# Patient Record
Sex: MALE | Race: OTHER | Employment: UNEMPLOYED | URBAN - METROPOLITAN AREA
[De-identification: names, ages, dates, MRNs, and addresses within clinical notes are randomized per-mention and may not be internally consistent; named-entity substitution may affect disease eponyms.]

---

## 2024-04-22 ENCOUNTER — HOSPITAL ENCOUNTER (EMERGENCY)
Facility: HOSPITAL | Age: 24
Discharge: HOME | End: 2024-04-22
Attending: EMERGENCY MEDICINE

## 2024-04-22 VITALS
OXYGEN SATURATION: 98 % | RESPIRATION RATE: 18 BRPM | SYSTOLIC BLOOD PRESSURE: 147 MMHG | TEMPERATURE: 99.7 F | HEART RATE: 64 BPM | DIASTOLIC BLOOD PRESSURE: 80 MMHG

## 2024-04-22 DIAGNOSIS — Z20.2 STD EXPOSURE: Primary | ICD-10-CM

## 2024-04-22 LAB
BACTERIA UR QL AUTO: ABNORMAL /HPF
BILIRUB UR QL STRIP: NEGATIVE
CLARITY UR: CLEAR
COLOR UR: ABNORMAL
GLUCOSE UR STRIP-MCNC: NEGATIVE MG/DL
HGB UR QL STRIP.AUTO: NEGATIVE
KETONES UR STRIP-MCNC: ABNORMAL MG/DL
LEUKOCYTE ESTERASE UR QL STRIP: ABNORMAL
NITRITE UR QL STRIP: NEGATIVE
NON-SQ EPI CELLS URNS QL MICRO: ABNORMAL /HPF
PH UR STRIP.AUTO: 6 [PH]
PROT UR STRIP-MCNC: NEGATIVE MG/DL
RBC #/AREA URNS AUTO: ABNORMAL /HPF
SP GR UR STRIP.AUTO: 1.01 (ref 1–1.03)
UROBILINOGEN UR STRIP-ACNC: <2 MG/DL
WBC #/AREA URNS AUTO: ABNORMAL /HPF

## 2024-04-22 PROCEDURE — 96372 THER/PROPH/DIAG INJ SC/IM: CPT

## 2024-04-22 PROCEDURE — 99283 EMERGENCY DEPT VISIT LOW MDM: CPT

## 2024-04-22 PROCEDURE — 87086 URINE CULTURE/COLONY COUNT: CPT

## 2024-04-22 PROCEDURE — 87591 N.GONORRHOEAE DNA AMP PROB: CPT

## 2024-04-22 PROCEDURE — 81001 URINALYSIS AUTO W/SCOPE: CPT

## 2024-04-22 PROCEDURE — 99284 EMERGENCY DEPT VISIT MOD MDM: CPT | Performed by: EMERGENCY MEDICINE

## 2024-04-22 PROCEDURE — 87491 CHLMYD TRACH DNA AMP PROBE: CPT

## 2024-04-22 RX ORDER — DOXYCYCLINE HYCLATE 100 MG/1
100 CAPSULE ORAL 2 TIMES DAILY
Qty: 14 CAPSULE | Refills: 0 | Status: SHIPPED | OUTPATIENT
Start: 2024-04-22 | End: 2024-04-29

## 2024-04-22 RX ORDER — DOXYCYCLINE HYCLATE 100 MG/1
100 CAPSULE ORAL ONCE
Status: COMPLETED | OUTPATIENT
Start: 2024-04-22 | End: 2024-04-22

## 2024-04-22 RX ORDER — ONDANSETRON 4 MG/1
4 TABLET, ORALLY DISINTEGRATING ORAL ONCE
Status: COMPLETED | OUTPATIENT
Start: 2024-04-22 | End: 2024-04-22

## 2024-04-22 RX ADMIN — CEFTRIAXONE 500 MG: 1 INJECTION, POWDER, FOR SOLUTION INTRAMUSCULAR; INTRAVENOUS at 15:45

## 2024-04-22 RX ADMIN — DOXYCYCLINE 100 MG: 100 CAPSULE ORAL at 15:44

## 2024-04-22 RX ADMIN — ONDANSETRON 4 MG: 4 TABLET, ORALLY DISINTEGRATING ORAL at 15:44

## 2024-04-22 NOTE — ED ATTENDING ATTESTATION
Final Diagnoses:     1. STD exposure           I, Victor M Lr MD, saw and evaluated the patient. All available labs and X-rays were ordered by me or the resident / non-physician and have been reviewed by myself. I discussed the patient with the resident / non-physician and agree with the resident's / non-physician practitioner's findings and plan as documented in the resident's / non-physician practicitioner's note, except where noted.   At this point, I agree with the current assessment done in the ED.   I was present during key portions of all procedures performed unless otherwise stated.     HPI:  NURSING TRIAGE:    This is a 23 y.o. male presenting for evaluation of STD testing. Girlfriend has gonorrhea and herpes.   Patient wants testing.   No f/ch/n/v.  Denies any urinary tract infection symptoms (burning, itching, pain, blood, frequency).   Chief Complaint   Patient presents with    Exposure to STD     Pt's girlfriend had +gonorrhea test and pt wants to get checked. Pt denies any symptoms at this time.       PHYSICAL: ASSESSMENT + PLAN:   General: VS reviewed  Appears in NAD  awake, alert.   Well-nourished, well-developed. Appears stated age.   Speaking normally in full sentences.   Head: Normocephalic, atraumatic  Eyes: EOM-I. No diplopia.   No hyphema.   No subconjunctival hemorrhages.  Symmetrical lids.   ENT: Atraumatic external nose and ears.    MMM  No malocclusion. No stridor. Normal phonation. No drooling. Normal swallowing.   Neck: No JVD.  CV: No pallor noted  Lungs:   No tachypnea  No respiratory distress  Abd: soft nt nd no rebound/guarding  MSK:   FROM spontaneously  Skin: Dry, intact.   Neuro: Awake, alert, GCS15, CN II-XII grossly intact.   Motor grossly intact.  Psychiatric/Behavioral: interacting normally; appropriate mood/affect.    Exam: deferred    Vitals:    04/22/24 1406   BP: 147/80   Pulse: 64   Resp: 18   Temp: 99.7 °F (37.6 °C)   TempSrc: Temporal   SpO2: 98%    STD  testing  Empiric ABX  DC     There are no obvious limitations to social determinants of care.   Nursing note reviewed.   Vitals reviewed.   Orders placed by myself and/or advanced practitioner / resident.    Previous chart was reviewed  No language barrier.   History obtained from patient.    There are no limitations to the history obtained:     Past Medical: Past Surgical:    has no past medical history on file.  has no past surgical history on file.   Social: Cardiac (Echo/Cath)   Social History     Substance and Sexual Activity   Alcohol Use None     Social History     Tobacco Use   Smoking Status Not on file   Smokeless Tobacco Not on file     Social History     Substance and Sexual Activity   Drug Use Not on file    No results found for this or any previous visit.    No results found for this or any previous visit.    No results found for this or any previous visit.     Labs: Imaging:   Labs Reviewed - No data to display No orders to display      Medications: Code Status:   Medications   cefTRIAXone (ROCEPHIN) 1,000 mg in lidocaine (PF) (XYLOCAINE-MPF) 1 % IM only syringe (has no administration in time range)   doxycycline hyclate (VIBRAMYCIN) capsule 100 mg (has no administration in time range)    Code Status: No Order  Advance Directive and Living Will:      Power of :    POLST:       Orders Placed This Encounter   Procedures    Chlamydia/GC amplified DNA by PCR    UA w Reflex to Microscopic w Reflex to Culture     Time reflects when diagnosis was documented in both MDM as applicable and the Disposition within this note       Time User Action Codes Description Comment    4/22/2024  3:33 PM Victor M Lr Add [Z20.2] STD exposure           ED Disposition       ED Disposition   Discharge    Condition   Stable    Date/Time   Mon Apr 22, 2024  3:30 PM    Comment   Guille Bo discharge to home/self care.                   Follow-up Information    None       Patient's Medications  "   No medications on file     No discharge procedures on file.  None                        Portions of the record may have been created with voice recognition software. Occasional wrong word or \"sound a like\" substitutions may have occurred due to the inherent limitations of voice recognition software. Read the chart carefully and recognize, using context, where substitutions have occurred.    Electronically signed by:  Victor M Lr  " Detail Level: Detailed Lesion Type: Abscess Method: sterile needle Curette: No Anesthesia Type: 1% lidocaine without epinephrine Anesthesia Volume In Cc: 1 Size Of Lesion In Cm (Optional But May Be Required For Some Insurances): 0 Wound Care: Mupirocin Dressing: Band-Aid Epidermal Sutures: 4-0 Ethilon Epidermal Closure: simple interrupted Suture Text: The incision was partially closed with Preparation Text: The area was prepped in the usual clean fashion. Curette Text (Optional): After the contents were expressed a curette was used to partially remove the cyst wall. Consent was obtained and risks were reviewed including but not limited to delayed wound healing, infection, need for multiple I and D's, and pain. Render Postcare In Note?: Yes Post-Care Instructions: I reviewed with the patient in detail post-care instructions. Patient should keep wound covered and call the office should any redness, pain, swelling or worsening occur.

## 2024-04-22 NOTE — ED PROVIDER NOTES
History  Chief Complaint   Patient presents with    Exposure to STD     Pt's girlfriend had +gonorrhea test and pt wants to get checked. Pt denies any symptoms at this time.      Patient is a 23-year-old male presenting after gonorrhea exposure.  Patient is sexually active with a female partner who recently tested positive for gonorrhea.  He is requesting testing and treatment.  He is otherwise asymptomatic with negative review of systems.        None       History reviewed. No pertinent past medical history.    History reviewed. No pertinent surgical history.    History reviewed. No pertinent family history.  I have reviewed and agree with the history as documented.    E-Cigarette/Vaping     E-Cigarette/Vaping Substances           Review of Systems   All other systems reviewed and are negative.      Physical Exam  ED Triage Vitals [04/22/24 1406]   Temperature Pulse Respirations Blood Pressure SpO2   99.7 °F (37.6 °C) 64 18 147/80 98 %      Temp Source Heart Rate Source Patient Position - Orthostatic VS BP Location FiO2 (%)   Temporal Monitor -- -- --      Pain Score       --             Orthostatic Vital Signs  Vitals:    04/22/24 1406   BP: 147/80   Pulse: 64       Physical Exam  Vitals and nursing note reviewed.   Constitutional:       General: He is not in acute distress.     Appearance: He is well-developed.   HENT:      Head: Normocephalic and atraumatic.   Eyes:      Conjunctiva/sclera: Conjunctivae normal.   Cardiovascular:      Rate and Rhythm: Normal rate and regular rhythm.      Heart sounds: No murmur heard.  Pulmonary:      Effort: Pulmonary effort is normal. No respiratory distress.      Breath sounds: Normal breath sounds.   Abdominal:      Palpations: Abdomen is soft.      Tenderness: There is no abdominal tenderness.   Musculoskeletal:         General: No swelling.      Cervical back: Neck supple.   Skin:     General: Skin is warm and dry.      Capillary Refill: Capillary refill takes less than 2  seconds.   Neurological:      Mental Status: He is alert.   Psychiatric:         Mood and Affect: Mood normal.         ED Medications  Medications   doxycycline hyclate (VIBRAMYCIN) capsule 100 mg (100 mg Oral Given 4/22/24 1544)   ondansetron (ZOFRAN-ODT) dispersible tablet 4 mg (4 mg Oral Given 4/22/24 1544)   cefTRIAXone (ROCEPHIN) 500 mg in lidocaine (PF) (XYLOCAINE-MPF) 1 % IM only syringe (500 mg Intramuscular Given 4/22/24 1545)       Diagnostic Studies  Results Reviewed       Procedure Component Value Units Date/Time    Urine Microscopic [[id number]]  (Abnormal) Collected: 04/22/24 1550    Lab Status: Final result Specimen: Urine, Other Updated: 04/22/24 1606     RBC, UA 1-2 /hpf      WBC, UA 30-50 /hpf      Epithelial Cells None Seen /hpf      Bacteria, UA None Seen /hpf     Urine culture [[id number]] Collected: 04/22/24 1550    Lab Status: In process Specimen: Urine, Other Updated: 04/22/24 1606    UA w Reflex to Microscopic w Reflex to Culture [[id number]]  (Abnormal) Collected: 04/22/24 1550    Lab Status: Final result Specimen: Urine, Other Updated: 04/22/24 1604     Color, UA Light Yellow     Clarity, UA Clear     Specific Gravity, UA 1.012     pH, UA 6.0     Leukocytes, UA Moderate     Nitrite, UA Negative     Protein, UA Negative mg/dl      Glucose, UA Negative mg/dl      Ketones, UA Trace mg/dl      Urobilinogen, UA <2.0 mg/dl      Bilirubin, UA Negative     Occult Blood, UA Negative    Chlamydia/GC amplified DNA by PCR [[id number]] Collected: 04/22/24 1550    Lab Status: In process Specimen: Urine, Other Updated: 04/22/24 1554                   No orders to display         Procedures  Procedures      ED Course                             SBIRT 20yo+      Flowsheet Row Most Recent Value   Initial Alcohol Screen: US AUDIT-C     1. How often do you have a drink containing alcohol? 0 Filed at: 04/22/2024 8065   2. How many drinks containing alcohol do you have on a typical day you are drinking?  0 Filed  at: 04/22/2024 1409   3a. Male UNDER 65: How often do you have five or more drinks on one occasion? 0 Filed at: 04/22/2024 1409   Audit-C Score 0 Filed at: 04/22/2024 1409   GRICEL: How many times in the past year have you...    Used an illegal drug or used a prescription medication for non-medical reasons? Never Filed at: 04/22/2024 1409                  Medical Decision Making  Will test for gonorrhea chlamydia and treat empirically with IM Rocephin and doxycycline course.  Patient's exam unremarkable.  Return precautions discussed, all questions answered prior to discharge.    Amount and/or Complexity of Data Reviewed  Labs: ordered.    Risk  Prescription drug management.          Disposition  Final diagnoses:   STD exposure     Time reflects when diagnosis was documented in both MDM as applicable and the Disposition within this note       Time User Action Codes Description Comment    4/22/2024  3:33 PM Victor M Lr Add [Z20.2] STD exposure           ED Disposition       ED Disposition   Discharge    Condition   Stable    Date/Time   Mon Apr 22, 2024 1530    Comment   Guille Bo discharge to home/self care.                   Follow-up Information    None         Discharge Medication List as of 4/22/2024  3:38 PM        START taking these medications    Details   doxycycline hyclate (VIBRAMYCIN) 100 mg capsule Take 1 capsule (100 mg total) by mouth 2 (two) times a day for 7 days, Starting Mon 4/22/2024, Until Mon 4/29/2024, Print           No discharge procedures on file.    PDMP Review       None             ED Provider  Attending physically available and evaluated Guille Bo. I managed the patient along with the ED Attending.    Electronically Signed by           Huy Marques MD  04/22/24 5491

## 2024-04-22 NOTE — DISCHARGE INSTRUCTIONS
Sexually Transmitted Disease Clinics:    [facility]    Tuesday 2p-4p Wednesday 6p-8p  Free confidential treatment; walk-in    [facility]       [facility]  Mondays only 5p-7p    [facility]:    [facility] [place] [address] [place] [id number]  Tuesday 2p-4p  Wednesday 6p-8p    [facility]        Take doxycycline 2 times a day for the next 7 days

## 2024-04-23 LAB
BACTERIA UR CULT: NORMAL
C TRACH DNA SPEC QL NAA+PROBE: POSITIVE
N GONORRHOEA DNA SPEC QL NAA+PROBE: NEGATIVE